# Patient Record
Sex: FEMALE | Race: OTHER | NOT HISPANIC OR LATINO | URBAN - METROPOLITAN AREA
[De-identification: names, ages, dates, MRNs, and addresses within clinical notes are randomized per-mention and may not be internally consistent; named-entity substitution may affect disease eponyms.]

---

## 2021-10-19 ENCOUNTER — PREPPED CHART (OUTPATIENT)
Dept: URBAN - METROPOLITAN AREA CLINIC 50 | Facility: CLINIC | Age: 76
End: 2021-10-19

## 2021-11-09 ENCOUNTER — NEW PATIENT CONSULT (OUTPATIENT)
Dept: URBAN - METROPOLITAN AREA CLINIC 49 | Facility: CLINIC | Age: 76
End: 2021-11-09

## 2021-11-09 DIAGNOSIS — E11.3293: ICD-10-CM

## 2021-11-09 DIAGNOSIS — H35.373: ICD-10-CM

## 2021-11-09 DIAGNOSIS — H25.813: ICD-10-CM

## 2021-11-09 DIAGNOSIS — H43.813: ICD-10-CM

## 2021-11-09 PROCEDURE — 92015 DETERMINE REFRACTIVE STATE: CPT

## 2021-11-09 PROCEDURE — 92004 COMPRE OPH EXAM NEW PT 1/>: CPT

## 2021-11-09 PROCEDURE — 92134 CPTRZ OPH DX IMG PST SGM RTA: CPT

## 2021-11-09 ASSESSMENT — VISUAL ACUITY
OD_CC: 20/40-2
OS_CC: 20/40
OU_CC: J1+/-

## 2021-11-09 ASSESSMENT — KERATOMETRY
OS_K1POWER_DIOPTERS: 45.75
OS_K2POWER_DIOPTERS: 45.50
OD_AXISANGLE2_DEGREES: 007
OS_AXISANGLE2_DEGREES: 030
OD_K2POWER_DIOPTERS: 45.50
OD_K1POWER_DIOPTERS: 46.00
OD_AXISANGLE_DEGREES: 97
OS_AXISANGLE_DEGREES: 120

## 2021-11-09 ASSESSMENT — TONOMETRY
OD_IOP_MMHG: 17
OS_IOP_MMHG: 16

## 2021-12-07 ENCOUNTER — DIAGNOSTICS ONLY (OUTPATIENT)
Dept: URBAN - METROPOLITAN AREA CLINIC 49 | Facility: CLINIC | Age: 76
End: 2021-12-07

## 2021-12-07 DIAGNOSIS — H25.813: ICD-10-CM

## 2021-12-07 PROCEDURE — TOPOIOL CORNEAL TOPOGRAPHY-PREMIUM IOL

## 2021-12-07 PROCEDURE — 92136 OPHTHALMIC BIOMETRY: CPT

## 2021-12-07 ASSESSMENT — KERATOMETRY
OD_K1POWER_DIOPTERS: 45.75
OD_K2POWER_DIOPTERS: 45.12
OD_AXISANGLE_DEGREES: 116
OS_K1POWER_DIOPTERS: 45.75
OS_K2POWER_DIOPTERS: 45.50
OS_AXISANGLE2_DEGREES: 17
OD_AXISANGLE2_DEGREES: 26
OS_AXISANGLE_DEGREES: 107

## 2022-11-01 ENCOUNTER — COMPREHENSIVE EXAM (OUTPATIENT)
Dept: URBAN - METROPOLITAN AREA CLINIC 49 | Facility: CLINIC | Age: 77
End: 2022-11-01

## 2022-11-01 DIAGNOSIS — H43.813: ICD-10-CM

## 2022-11-01 DIAGNOSIS — H35.373: ICD-10-CM

## 2022-11-01 DIAGNOSIS — E11.3293: ICD-10-CM

## 2022-11-01 DIAGNOSIS — H35.033: ICD-10-CM

## 2022-11-01 DIAGNOSIS — H25.813: ICD-10-CM

## 2022-11-01 PROCEDURE — 92134 CPTRZ OPH DX IMG PST SGM RTA: CPT

## 2022-11-01 PROCEDURE — 92014 COMPRE OPH EXAM EST PT 1/>: CPT

## 2022-11-01 ASSESSMENT — VISUAL ACUITY
OD_GLARE: 20/80
OS_GLARE: 20/200
OD_SC: 20/70
OS_GLARE: 20/80
OS_SC: 20/60
OD_GLARE: 20/200
OD_PH: 20/50

## 2022-11-01 ASSESSMENT — KERATOMETRY
OS_K1POWER_DIOPTERS: 45.75
OS_K2POWER_DIOPTERS: 45.50
OD_K2POWER_DIOPTERS: 45.12
OD_AXISANGLE2_DEGREES: 26
OD_K1POWER_DIOPTERS: 45.75
OS_AXISANGLE_DEGREES: 107
OD_AXISANGLE_DEGREES: 116
OS_AXISANGLE2_DEGREES: 17

## 2022-11-01 ASSESSMENT — TONOMETRY
OS_IOP_MMHG: 17
OD_IOP_MMHG: 17

## 2022-11-01 NOTE — PATIENT DISCUSSION
Will send letter to PCP. Poorly controlled diabetes per patient, may need clearance from PCP since the patient has a hx. of not keeping her blood sugar stable.